# Patient Record
Sex: FEMALE | Race: WHITE | NOT HISPANIC OR LATINO | Employment: OTHER | URBAN - METROPOLITAN AREA
[De-identification: names, ages, dates, MRNs, and addresses within clinical notes are randomized per-mention and may not be internally consistent; named-entity substitution may affect disease eponyms.]

---

## 2019-02-21 ENCOUNTER — TELEPHONE (OUTPATIENT)
Dept: CARDIOLOGY | Facility: CLINIC | Age: 54
End: 2019-02-21

## 2019-02-21 NOTE — TELEPHONE ENCOUNTER
Pt called to be worked in today with SHON. She has consult appt 3/7/19. She had episode of pain yesterday and drove 4 hours to come to ER (she went to [facility] not [facility]). She wanted to be seen today to keep from making another 4 hour trip She is getting records from ER at [facility]. She was offered consult with available provider today however she declines. She would like to see MRJ only. She will keep March 7th appt. Records in chart reviewed, normal echo, history of afib, normal coronaries from 2010. ISELA

## 2019-03-07 ENCOUNTER — APPOINTMENT (OUTPATIENT)
Dept: PREADMISSION TESTING | Facility: HOSPITAL | Age: 54
End: 2019-03-07

## 2019-03-07 ENCOUNTER — CONSULT (OUTPATIENT)
Dept: CARDIOLOGY | Facility: CLINIC | Age: 54
End: 2019-03-07

## 2019-03-07 VITALS
HEIGHT: 70 IN | BODY MASS INDEX: 38.22 KG/M2 | WEIGHT: 267 LBS | DIASTOLIC BLOOD PRESSURE: 112 MMHG | SYSTOLIC BLOOD PRESSURE: 148 MMHG | HEART RATE: 71 BPM

## 2019-03-07 DIAGNOSIS — R94.31 ABNORMAL EKG: ICD-10-CM

## 2019-03-07 DIAGNOSIS — I10 ESSENTIAL HYPERTENSION: ICD-10-CM

## 2019-03-07 DIAGNOSIS — H54.8 LEGALLY BLIND: ICD-10-CM

## 2019-03-07 DIAGNOSIS — R94.39 ABNORMAL STRESS TEST: ICD-10-CM

## 2019-03-07 DIAGNOSIS — R07.9 CHEST PAIN, UNSPECIFIED TYPE: Primary | ICD-10-CM

## 2019-03-07 DIAGNOSIS — R07.9 CHEST PAIN, UNSPECIFIED TYPE: ICD-10-CM

## 2019-03-07 DIAGNOSIS — I48.0 PAF (PAROXYSMAL ATRIAL FIBRILLATION) (HCC): ICD-10-CM

## 2019-03-07 LAB
ALBUMIN SERPL-MCNC: 4.6 G/DL (ref 3.2–4.8)
ALBUMIN/GLOB SERPL: 2.1 G/DL (ref 1.5–2.5)
ALP SERPL-CCNC: 94 U/L (ref 25–100)
ALT SERPL W P-5'-P-CCNC: 24 U/L (ref 7–40)
ANION GAP SERPL CALCULATED.3IONS-SCNC: 9 MMOL/L (ref 3–11)
ARTICHOKE IGE QN: 112 MG/DL (ref 0–130)
AST SERPL-CCNC: 20 U/L (ref 0–33)
BILIRUB SERPL-MCNC: 0.4 MG/DL (ref 0.3–1.2)
BUN BLD-MCNC: 22 MG/DL (ref 9–23)
BUN/CREAT SERPL: 23.2 (ref 7–25)
CALCIUM SPEC-SCNC: 9.4 MG/DL (ref 8.7–10.4)
CHLORIDE SERPL-SCNC: 107 MMOL/L (ref 99–109)
CHOLEST SERPL-MCNC: 158 MG/DL (ref 0–200)
CO2 SERPL-SCNC: 28 MMOL/L (ref 20–31)
CREAT BLD-MCNC: 0.95 MG/DL (ref 0.6–1.3)
DEPRECATED RDW RBC AUTO: 49.6 FL (ref 37–54)
ERYTHROCYTE [DISTWIDTH] IN BLOOD BY AUTOMATED COUNT: 14 % (ref 11.3–14.5)
GFR SERPL CREATININE-BSD FRML MDRD: 61 ML/MIN/1.73
GLOBULIN UR ELPH-MCNC: 2.2 GM/DL
GLUCOSE BLD-MCNC: 86 MG/DL (ref 70–100)
HBA1C MFR BLD: 5.6 % (ref 4.8–5.6)
HCT VFR BLD AUTO: 43.8 % (ref 34.5–44)
HDLC SERPL-MCNC: 28 MG/DL (ref 40–60)
HGB BLD-MCNC: 13.4 G/DL (ref 11.5–15.5)
MCH RBC QN AUTO: 29.5 PG (ref 27–31)
MCHC RBC AUTO-ENTMCNC: 30.6 G/DL (ref 32–36)
MCV RBC AUTO: 96.5 FL (ref 80–99)
PLATELET # BLD AUTO: 174 10*3/MM3 (ref 150–450)
PMV BLD AUTO: 11.5 FL (ref 6–12)
POTASSIUM BLD-SCNC: 4.2 MMOL/L (ref 3.5–5.5)
PROT SERPL-MCNC: 6.8 G/DL (ref 5.7–8.2)
RBC # BLD AUTO: 4.54 10*6/MM3 (ref 3.89–5.14)
SODIUM BLD-SCNC: 144 MMOL/L (ref 132–146)
TRIGL SERPL-MCNC: 154 MG/DL (ref 0–150)
TSH SERPL DL<=0.05 MIU/L-ACNC: 2 MIU/ML (ref 0.35–5.35)
WBC NRBC COR # BLD: 6.45 10*3/MM3 (ref 3.5–10.8)

## 2019-03-07 PROCEDURE — 99204 OFFICE O/P NEW MOD 45 MIN: CPT | Performed by: INTERNAL MEDICINE

## 2019-03-07 PROCEDURE — 85027 COMPLETE CBC AUTOMATED: CPT | Performed by: PHYSICIAN ASSISTANT

## 2019-03-07 PROCEDURE — 83036 HEMOGLOBIN GLYCOSYLATED A1C: CPT | Performed by: PHYSICIAN ASSISTANT

## 2019-03-07 PROCEDURE — 93000 ELECTROCARDIOGRAM COMPLETE: CPT | Performed by: INTERNAL MEDICINE

## 2019-03-07 PROCEDURE — 80061 LIPID PANEL: CPT | Performed by: PHYSICIAN ASSISTANT

## 2019-03-07 PROCEDURE — 84443 ASSAY THYROID STIM HORMONE: CPT | Performed by: PHYSICIAN ASSISTANT

## 2019-03-07 PROCEDURE — 36415 COLL VENOUS BLD VENIPUNCTURE: CPT

## 2019-03-07 PROCEDURE — 80053 COMPREHEN METABOLIC PANEL: CPT | Performed by: PHYSICIAN ASSISTANT

## 2019-03-07 RX ORDER — CLOPIDOGREL BISULFATE 75 MG/1
600 TABLET ORAL ONCE
Status: CANCELLED | OUTPATIENT
Start: 2019-03-07 | End: 2019-03-07

## 2019-03-07 RX ORDER — CLOPIDOGREL BISULFATE 75 MG/1
75 TABLET ORAL DAILY
Status: CANCELLED | OUTPATIENT
Start: 2019-03-08

## 2019-03-07 RX ORDER — METOPROLOL SUCCINATE 50 MG/1
50 TABLET, EXTENDED RELEASE ORAL EVERY MORNING
COMMUNITY
End: 2019-09-19 | Stop reason: SDUPTHER

## 2019-03-07 RX ORDER — HYDROCODONE BITARTRATE AND ACETAMINOPHEN 10; 325 MG/1; MG/1
1 TABLET ORAL EVERY 8 HOURS PRN
COMMUNITY

## 2019-03-07 RX ORDER — DILTIAZEM HYDROCHLORIDE 240 MG/1
240 CAPSULE, COATED, EXTENDED RELEASE ORAL DAILY
COMMUNITY
End: 2020-09-29 | Stop reason: SDUPTHER

## 2019-03-07 RX ORDER — MELATONIN
1000 TAKE AS DIRECTED
COMMUNITY
End: 2019-09-19

## 2019-03-07 RX ORDER — OMEPRAZOLE 20 MG/1
20 CAPSULE, DELAYED RELEASE ORAL DAILY
Status: ON HOLD | COMMUNITY
End: 2019-03-15

## 2019-03-07 RX ORDER — ACETAMINOPHEN 325 MG/1
650 TABLET ORAL EVERY 4 HOURS PRN
Status: CANCELLED | OUTPATIENT
Start: 2019-03-07

## 2019-03-07 RX ORDER — NITROGLYCERIN 0.4 MG/1
0.4 TABLET SUBLINGUAL
COMMUNITY
End: 2021-06-24

## 2019-03-07 RX ORDER — AMOXICILLIN 125 MG/5ML
POWDER, FOR SUSPENSION ORAL 3 TIMES DAILY
COMMUNITY
End: 2019-03-07

## 2019-03-07 RX ORDER — NITROGLYCERIN 0.4 MG/1
0.4 TABLET SUBLINGUAL
Status: CANCELLED | OUTPATIENT
Start: 2019-03-07

## 2019-03-07 RX ORDER — METOPROLOL SUCCINATE 25 MG/1
25 TABLET, EXTENDED RELEASE ORAL EVERY EVENING
COMMUNITY
End: 2020-09-29 | Stop reason: SDUPTHER

## 2019-03-07 NOTE — DISCHARGE INSTRUCTIONS

## 2019-03-07 NOTE — H&P (VIEW-ONLY)
[facility] at [facility]  INITIAL OFFICE CONSULT    Celia Hurt  : 1965  MRN:[id number]  Patient Address: [address]  [facility] [address]    Date of Encounter: 2019    PCP: Miroslava White MD  [id number]   [facility] [address]  Referring MD: Dr. White    IDENTIFICATION: A 53 y.o. female resident of [place], [place]     Chief Complaint   Patient presents with   • Chest Pain   • Shortness of Breath   • Dizziness   • Edema     PROBLEM LIST:   1. Chest pain   a. Cardiac catheterization 2010: Normal coronaries, IDB  b. Lexiscan MPS 2018: discordant interpretation with MPS imaging normal, however also reported inferoapical infarct vs artifact  c. Echo 2018: Normal LVEF, no significant abnormalities noted   d. Mixed feature chest pain 2019   2. Paroxysmal atrial fibrillation   a. Diagnosed summer 2018  b. 30 day monitor 10/2018: SR with occasional PAC’s and PVC’s   c. CHADsVASC= 2, on Xarelto   3. Hypertension   4. 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 GERD  6. History of tobacco abuse, inactive   7. Surgical history:  a. Cholecystectomy  b. Left nephrectomy     ALLERGIES: Allergies not on file    CURRENT MEDICATIONS:   •  diltiaZEM CD (CARDIZEM CD) 240 MG 24 hr capsule,  Daily  •  Ergocalciferol (VITAMIN D2 PO), Take 50,000 Units by mouth 1 (One) Time Per Week.  •  HYDROcodone-acetaminophen (NORCO)  MG per tablet,  Every 6 (Six) Hours As Needed for Moderate Pain  •  metoprolol succinate XL (TOPROL-XL) 25 MG 24 hr tablet, Take 25 mg by mouth Every Evening  •  metoprolol succinate XL (TOPROL-XL) 50 MG 24 hr tablet, Take 50 mg by mouth Every Morning.  •  nitroglycerin (NITROSTAT) 0.4 MG SL tablet, Place 0.4 mg under the tongue Every 5 (Five) Minutes As Needed for Chest Pain. Take no more than 3 doses in 15 minutes  •  omeprazole (priLOSEC) 20 MG capsule, Take 20 mg by mouth Daily.  •  rivaroxaban (XARELTO) 20 MG tablet, Take 20 mg by mouth Daily    HPI: Mrs.  Blu is a 53 y.o WF with above noted history who presents in consultation for a second opinion. She has previously been seen by Dr. Tahir Bullock in [place]. Since earlier this summer she has been to the ER in [facility] with episodes of chest pain, and has been diagnosed with atrial fibrillation at one of these emergency room visits. Strips or EKG showing atrial fibrillation are not available. She has been started on Xarelto and Diltiazem and has undergone an echo and stress test. The stress test has discordant findings with findings reported of normal perfusion scan, however also reported inferoapical infarct vs artifact. Her EKG in office today is abnormal showing inferior Q waves. She has continued to have intermittent episodes of atypical chest pain and presented to [facility] ER 2 weeks ago for this complaint. She ruled out for MI presents today for further evaluation. She is legally blind secondary to histoplasmosis in . She is relatively active however and mows grass as well as walks her dog. She denies history of MI, CVA, DVT or PE.      Cardiac Risk Factors include: dyslipidemia, hypertension and obesity (BMI >= 30 kg/m2)    ROS: All systems have been reviewed and are negative with the exception of those mentioned in the HPI and problem list above.    Surgical History: History reviewed. As above    Social History:   Social History     Socioeconomic History   • Marital status: Single   Occupational History   • Not on file   Tobacco Use   • Smoking status: Former Smoker     Last attempt to quit: 3/7/1999     Years since quittin.0   • Smokeless tobacco: Never Used   Substance and Sexual Activity   • Alcohol use: No     Frequency: Never   • Drug use: No     Family History:   Family History   Problem Relation Age of Onset   • Hyperlipidemia Mother    • Hypertension Father    • Clotting disorder Father    • Stroke Sister    • Hypertension Brother    • Hypertension Other    • Hyperlipidemia Other  "   • Heart disease Other    • Stroke Other    • Diabetes Other    • Clotting disorder Other        Objective     Vitals:    03/07/19 1309 03/07/19 1310 03/07/19 1311   BP: 171/96 146/97 (!) 148/112   BP Location: Right arm Left arm Left arm   Patient Position: Sitting Sitting Standing   Pulse: 59 58 71   Weight: 121 kg (267 lb)     Height: 177.8 cm (70\")       Body mass index is 38.31 kg/m².    PHYSICAL EXAM:  CONSTITUTIONAL: Well nourished, cooperative, in no acute distress  HEENT: Normocephalic, atraumatic, PERRLA, no JVD, no carotid bruit  CARDIOVASCULAR:  Regular rhythm and normal rate, no murmur, gallop, rub. Peripheral pulses are present and equal bilaterally  RESPIRATORY: Clear to auscultation, normal respiratory effort, no wheezing, rales or ronchi  GI: Soft, nontender, normal bowel sounds  MUSCULOSKELETAL: No gross deformities, no edema  SKIN: Warm, dry. No bleeding, bruising or rash  NEUROLOGICAL: No focal deficits  PSYCHIATRIC: Normal mood and affect. Behavior is normal     Labs/Diagnostic Data  Labs 10/2018:   CBC: WBC 5.2, RBC 4.42, Hgb 13.5, Hct 38.7, Plt 162  CMP: Glcuose 93, BUN 15, Cr 0.92, eGFR 71, Na 145, K 4.1, Cl 104, CO2 24, Ca 8.8, Alk Phos 86, AST 14, ALT 18  Lipid Panel: , HDL 27, LDL 82, Trig 192  A1C: 5.4%  TSH 2.45      ECG 12 Lead  Date/Time: 3/7/2019 5:23 PM  Performed by: Bertha Charles PA-C  Authorized by: Bertha Charles PA-C   Rhythm: sinus rhythm  Rate: normal  BPM: 58  QRS axis: left    Clinical impression: abnormal EKG  Comments: Inferior infarct, old         Radiology Data:   CXR report 2/21/2019 OSH: left basilar opacity, atelectasis vs pneumonia; recommend upright PA and lateral CXR     Assessment and Plan:     1. She has atypical chest pain, however her EKG is abnormal with inferior Q waves. She as well had a Cardiolite stress test with discordant interpretation of normal perfusion, however with an inferoapical defect.   2. She needs a catheterization " and we will plan radial approach.  3. We will in the meantime try to obtain documentation of atrial fibrillation.    4. Final disposition to follow.      Thank you for allowing me to participate in the care of Celia Hurt. Feel free to contact me directly with any further questions or concerns.    Scribed for Reji Rivera MD by Bertha Charles PA-C. 3/7/2019  1:26 PM   I, Reji Rivera MD, [facility], [facility], personally performed the services described in this documentation as scribed by the above named individual in my presence, and it is both accurate and complete. At 5:56 PM on 03/07/2019

## 2019-03-07 NOTE — PROGRESS NOTES
[facility] at [facility]  INITIAL OFFICE CONSULT    Celia Hurt  : 1965  MRN:[id number]  Patient Address: [address]  [facility] [address]    Date of Encounter: 2019    PCP: Miroslava White MD  [id number]   [facility] [address]  Referring MD: Dr. White    IDENTIFICATION: A 53 y.o. female resident of [place], [place]     Chief Complaint   Patient presents with   • Chest Pain   • Shortness of Breath   • Dizziness   • Edema     PROBLEM LIST:   1. Chest pain   a. Cardiac catheterization 2010: Normal coronaries, IDB  b. Lexiscan MPS 2018: discordant interpretation with MPS imaging normal, however also reported inferoapical infarct vs artifact  c. Echo 2018: Normal LVEF, no significant abnormalities noted   d. Mixed feature chest pain 2019   2. Paroxysmal atrial fibrillation   a. Diagnosed summer 2018  b. 30 day monitor 10/2018: SR with occasional PAC’s and PVC’s   c. CHADsVASC= 2, on Xarelto   3. Hypertension   4. 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 GERD  6. History of tobacco abuse, inactive   7. Surgical history:  a. Cholecystectomy  b. Left nephrectomy     ALLERGIES: Allergies not on file    CURRENT MEDICATIONS:   •  diltiaZEM CD (CARDIZEM CD) 240 MG 24 hr capsule,  Daily  •  Ergocalciferol (VITAMIN D2 PO), Take 50,000 Units by mouth 1 (One) Time Per Week.  •  HYDROcodone-acetaminophen (NORCO)  MG per tablet,  Every 6 (Six) Hours As Needed for Moderate Pain  •  metoprolol succinate XL (TOPROL-XL) 25 MG 24 hr tablet, Take 25 mg by mouth Every Evening  •  metoprolol succinate XL (TOPROL-XL) 50 MG 24 hr tablet, Take 50 mg by mouth Every Morning.  •  nitroglycerin (NITROSTAT) 0.4 MG SL tablet, Place 0.4 mg under the tongue Every 5 (Five) Minutes As Needed for Chest Pain. Take no more than 3 doses in 15 minutes  •  omeprazole (priLOSEC) 20 MG capsule, Take 20 mg by mouth Daily.  •  rivaroxaban (XARELTO) 20 MG tablet, Take 20 mg by mouth Daily    HPI: Mrs.  Blu is a 53 y.o WF with above noted history who presents in consultation for a second opinion. She has previously been seen by Dr. Tahir Bullock in [place]. Since earlier this summer she has been to the ER in [facility] with episodes of chest pain, and has been diagnosed with atrial fibrillation at one of these emergency room visits. Strips or EKG showing atrial fibrillation are not available. She has been started on Xarelto and Diltiazem and has undergone an echo and stress test. The stress test has discordant findings with findings reported of normal perfusion scan, however also reported inferoapical infarct vs artifact. Her EKG in office today is abnormal showing inferior Q waves. She has continued to have intermittent episodes of atypical chest pain and presented to [facility] ER 2 weeks ago for this complaint. She ruled out for MI presents today for further evaluation. She is legally blind secondary to histoplasmosis in . She is relatively active however and mows grass as well as walks her dog. She denies history of MI, CVA, DVT or PE.      Cardiac Risk Factors include: dyslipidemia, hypertension and obesity (BMI >= 30 kg/m2)    ROS: All systems have been reviewed and are negative with the exception of those mentioned in the HPI and problem list above.    Surgical History: History reviewed. As above    Social History:   Social History     Socioeconomic History   • Marital status: Single   Occupational History   • Not on file   Tobacco Use   • Smoking status: Former Smoker     Last attempt to quit: 3/7/1999     Years since quittin.0   • Smokeless tobacco: Never Used   Substance and Sexual Activity   • Alcohol use: No     Frequency: Never   • Drug use: No     Family History:   Family History   Problem Relation Age of Onset   • Hyperlipidemia Mother    • Hypertension Father    • Clotting disorder Father    • Stroke Sister    • Hypertension Brother    • Hypertension Other    • Hyperlipidemia Other  "   • Heart disease Other    • Stroke Other    • Diabetes Other    • Clotting disorder Other        Objective     Vitals:    03/07/19 1309 03/07/19 1310 03/07/19 1311   BP: 171/96 146/97 (!) 148/112   BP Location: Right arm Left arm Left arm   Patient Position: Sitting Sitting Standing   Pulse: 59 58 71   Weight: 121 kg (267 lb)     Height: 177.8 cm (70\")       Body mass index is 38.31 kg/m².    PHYSICAL EXAM:  CONSTITUTIONAL: Well nourished, cooperative, in no acute distress  HEENT: Normocephalic, atraumatic, PERRLA, no JVD, no carotid bruit  CARDIOVASCULAR:  Regular rhythm and normal rate, no murmur, gallop, rub. Peripheral pulses are present and equal bilaterally  RESPIRATORY: Clear to auscultation, normal respiratory effort, no wheezing, rales or ronchi  GI: Soft, nontender, normal bowel sounds  MUSCULOSKELETAL: No gross deformities, no edema  SKIN: Warm, dry. No bleeding, bruising or rash  NEUROLOGICAL: No focal deficits  PSYCHIATRIC: Normal mood and affect. Behavior is normal     Labs/Diagnostic Data  Labs 10/2018:   CBC: WBC 5.2, RBC 4.42, Hgb 13.5, Hct 38.7, Plt 162  CMP: Glcuose 93, BUN 15, Cr 0.92, eGFR 71, Na 145, K 4.1, Cl 104, CO2 24, Ca 8.8, Alk Phos 86, AST 14, ALT 18  Lipid Panel: , HDL 27, LDL 82, Trig 192  A1C: 5.4%  TSH 2.45      ECG 12 Lead  Date/Time: 3/7/2019 5:23 PM  Performed by: Bertha Charles PA-C  Authorized by: Bertha Charles PA-C   Rhythm: sinus rhythm  Rate: normal  BPM: 58  QRS axis: left    Clinical impression: abnormal EKG  Comments: Inferior infarct, old         Radiology Data:   CXR report 2/21/2019 OSH: left basilar opacity, atelectasis vs pneumonia; recommend upright PA and lateral CXR     Assessment and Plan:     1. She has atypical chest pain, however her EKG is abnormal with inferior Q waves. She as well had a Cardiolite stress test with discordant interpretation of normal perfusion, however with an inferoapical defect.   2. She needs a catheterization " and we will plan radial approach.  3. We will in the meantime try to obtain documentation of atrial fibrillation.    4. Final disposition to follow.      Thank you for allowing me to participate in the care of Celia Hurt. Feel free to contact me directly with any further questions or concerns.    Scribed for Reji Rivera MD by Bertha Charles PA-C. 3/7/2019  1:26 PM   I, Reji Rivera MD, [facility], [facility], personally performed the services described in this documentation as scribed by the above named individual in my presence, and it is both accurate and complete. At 5:56 PM on 03/07/2019

## 2019-03-15 ENCOUNTER — APPOINTMENT (OUTPATIENT)
Dept: GENERAL RADIOLOGY | Facility: HOSPITAL | Age: 54
End: 2019-03-15

## 2019-03-15 ENCOUNTER — HOSPITAL ENCOUNTER (OUTPATIENT)
Facility: HOSPITAL | Age: 54
Discharge: HOME | End: 2019-03-15
Attending: INTERNAL MEDICINE | Admitting: INTERNAL MEDICINE

## 2019-03-15 VITALS
HEART RATE: 60 BPM | OXYGEN SATURATION: 92 % | DIASTOLIC BLOOD PRESSURE: 68 MMHG | RESPIRATION RATE: 18 BRPM | WEIGHT: 268.74 LBS | BODY MASS INDEX: 38.47 KG/M2 | HEIGHT: 70 IN | SYSTOLIC BLOOD PRESSURE: 98 MMHG | TEMPERATURE: 97.9 F

## 2019-03-15 DIAGNOSIS — R94.31 ABNORMAL EKG: ICD-10-CM

## 2019-03-15 DIAGNOSIS — R94.39 ABNORMAL STRESS TEST: ICD-10-CM

## 2019-03-15 DIAGNOSIS — H54.8 LEGALLY BLIND: ICD-10-CM

## 2019-03-15 DIAGNOSIS — I48.0 PAF (PAROXYSMAL ATRIAL FIBRILLATION) (HCC): ICD-10-CM

## 2019-03-15 DIAGNOSIS — I10 ESSENTIAL HYPERTENSION: ICD-10-CM

## 2019-03-15 DIAGNOSIS — R07.9 CHEST PAIN, UNSPECIFIED TYPE: ICD-10-CM

## 2019-03-15 LAB — B-HCG UR QL: NEGATIVE

## 2019-03-15 PROCEDURE — 0 IOPAMIDOL PER 1 ML: Performed by: INTERNAL MEDICINE

## 2019-03-15 PROCEDURE — 71046 X-RAY EXAM CHEST 2 VIEWS: CPT

## 2019-03-15 PROCEDURE — 25010000002 FENTANYL CITRATE (PF) 100 MCG/2ML SOLUTION: Performed by: INTERNAL MEDICINE

## 2019-03-15 PROCEDURE — 63710000001 CLOPIDOGREL 75 MG TABLET: Performed by: PHYSICIAN ASSISTANT

## 2019-03-15 PROCEDURE — C1769 GUIDE WIRE: HCPCS | Performed by: INTERNAL MEDICINE

## 2019-03-15 PROCEDURE — A9270 NON-COVERED ITEM OR SERVICE: HCPCS | Performed by: PHYSICIAN ASSISTANT

## 2019-03-15 PROCEDURE — 25010000002 MIDAZOLAM PER 1 MG: Performed by: INTERNAL MEDICINE

## 2019-03-15 PROCEDURE — 93458 L HRT ARTERY/VENTRICLE ANGIO: CPT | Performed by: INTERNAL MEDICINE

## 2019-03-15 PROCEDURE — 81025 URINE PREGNANCY TEST: CPT | Performed by: INTERNAL MEDICINE

## 2019-03-15 PROCEDURE — 25010000002 HEPARIN (PORCINE) PER 1000 UNITS: Performed by: INTERNAL MEDICINE

## 2019-03-15 PROCEDURE — C1894 INTRO/SHEATH, NON-LASER: HCPCS | Performed by: INTERNAL MEDICINE

## 2019-03-15 RX ORDER — ACETAMINOPHEN 325 MG/1
650 TABLET ORAL EVERY 4 HOURS PRN
Status: DISCONTINUED | OUTPATIENT
Start: 2019-03-15 | End: 2019-03-15 | Stop reason: HOSPADM

## 2019-03-15 RX ORDER — CLOPIDOGREL BISULFATE 75 MG/1
600 TABLET ORAL ONCE
Status: COMPLETED | OUTPATIENT
Start: 2019-03-15 | End: 2019-03-15

## 2019-03-15 RX ORDER — NITROGLYCERIN 0.4 MG/1
0.4 TABLET SUBLINGUAL
Status: DISCONTINUED | OUTPATIENT
Start: 2019-03-15 | End: 2019-03-15 | Stop reason: HOSPADM

## 2019-03-15 RX ORDER — PANTOPRAZOLE SODIUM 40 MG/1
40 TABLET, DELAYED RELEASE ORAL EVERY MORNING
Status: DISCONTINUED | OUTPATIENT
Start: 2019-03-16 | End: 2019-03-15 | Stop reason: HOSPADM

## 2019-03-15 RX ORDER — METOPROLOL SUCCINATE 25 MG/1
25 TABLET, EXTENDED RELEASE ORAL EVERY EVENING
Status: DISCONTINUED | OUTPATIENT
Start: 2019-03-15 | End: 2019-03-15 | Stop reason: HOSPADM

## 2019-03-15 RX ORDER — SODIUM CHLORIDE 9 MG/ML
1-3 INJECTION, SOLUTION INTRAVENOUS CONTINUOUS
Status: DISCONTINUED | OUTPATIENT
Start: 2019-03-15 | End: 2019-03-15 | Stop reason: HOSPADM

## 2019-03-15 RX ORDER — CLOPIDOGREL BISULFATE 75 MG/1
75 TABLET ORAL DAILY
Status: DISCONTINUED | OUTPATIENT
Start: 2019-03-16 | End: 2019-03-15 | Stop reason: HOSPADM

## 2019-03-15 RX ORDER — MIDAZOLAM HYDROCHLORIDE 1 MG/ML
INJECTION INTRAMUSCULAR; INTRAVENOUS AS NEEDED
Status: DISCONTINUED | OUTPATIENT
Start: 2019-03-15 | End: 2019-03-15 | Stop reason: HOSPADM

## 2019-03-15 RX ORDER — OMEPRAZOLE 20 MG/1
40 CAPSULE, DELAYED RELEASE ORAL DAILY
COMMUNITY

## 2019-03-15 RX ORDER — LIDOCAINE HYDROCHLORIDE 10 MG/ML
INJECTION, SOLUTION EPIDURAL; INFILTRATION; INTRACAUDAL; PERINEURAL AS NEEDED
Status: DISCONTINUED | OUTPATIENT
Start: 2019-03-15 | End: 2019-03-15 | Stop reason: HOSPADM

## 2019-03-15 RX ORDER — DILTIAZEM HYDROCHLORIDE 240 MG/1
240 CAPSULE, COATED, EXTENDED RELEASE ORAL DAILY
Status: DISCONTINUED | OUTPATIENT
Start: 2019-03-16 | End: 2019-03-15 | Stop reason: HOSPADM

## 2019-03-15 RX ORDER — ATORVASTATIN CALCIUM 20 MG/1
20 TABLET, FILM COATED ORAL NIGHTLY
Qty: 30 TABLET | Refills: 11 | Status: SHIPPED | OUTPATIENT
Start: 2019-03-15 | End: 2020-09-29 | Stop reason: SDUPTHER

## 2019-03-15 RX ORDER — FENTANYL CITRATE 50 UG/ML
INJECTION, SOLUTION INTRAMUSCULAR; INTRAVENOUS AS NEEDED
Status: DISCONTINUED | OUTPATIENT
Start: 2019-03-15 | End: 2019-03-15 | Stop reason: HOSPADM

## 2019-03-15 RX ORDER — ASPIRIN 81 MG/1
81 TABLET ORAL EVERY OTHER DAY
COMMUNITY
End: 2021-06-24

## 2019-03-15 RX ORDER — ASPIRIN 81 MG/1
81 TABLET ORAL DAILY
Status: DISCONTINUED | OUTPATIENT
Start: 2019-03-16 | End: 2019-03-15 | Stop reason: HOSPADM

## 2019-03-15 RX ADMIN — SODIUM CHLORIDE 2.71 ML/KG/HR: 9 INJECTION, SOLUTION INTRAVENOUS at 10:31

## 2019-03-15 RX ADMIN — CLOPIDOGREL BISULFATE 600 MG: 75 TABLET ORAL at 09:42

## 2019-03-15 NOTE — INTERVAL H&P NOTE
"  H&P reviewed. The patient was examined and there are no changes to the H&P.       Vitals and Labs:  Vitals:    03/15/19 0915 03/15/19 0917   BP: 145/93 157/98   BP Location: Right arm Left arm   Patient Position: Lying Lying   Pulse: 72 70   Resp: 18    Temp: 97.9 °F (36.6 °C)    TempSrc: Temporal    SpO2: 95% 93%   Weight: 122 kg (268 lb 11.9 oz)    Height: 177.8 cm (70\")        Lab Results   Component Value Date    WBC 6.45 03/07/2019    HGB 13.4 03/07/2019    HCT 43.8 03/07/2019    MCV 96.5 03/07/2019     03/07/2019     Lab Results   Component Value Date    GLUCOSE 86 03/07/2019    BUN 22 03/07/2019    CREATININE 0.95 03/07/2019    EGFRIFNONA 61 03/07/2019    BCR 23.2 03/07/2019    K 4.2 03/07/2019    CO2 28.0 03/07/2019    CALCIUM 9.4 03/07/2019    ALBUMIN 4.60 03/07/2019    AST 20 03/07/2019    ALT 24 03/07/2019     Lab Results   Component Value Date    HGBA1C 5.60 03/07/2019     Lab Results   Component Value Date    CHOL 158 03/07/2019    TRIG 154 (H) 03/07/2019    HDL 28 (L) 03/07/2019     03/07/2019     Lab Results   Component Value Date    TSH 2.003 03/07/2019       Assessment:  · 54 y/o WF with history of tobacco abuse, HTN and PAF with recent symptoms of mixed feature chest pain and an indeterminate MPS due to discordant interpretation. She presents for definitive evaluation with left heart catheterization and intervention standby  · Last dose of Xarelto was Monday 3/11    Plan:   · LHC +/- CBI. Risks, benefits and alternatives to the procedure explained to the patient and she understands and wishes to proceed.     I, Reji Rivera MD, personally performed the services described as documented by the above named individual. I have made any necessary edits and it is both accurate and complete 3/15/2019  11:47 AM    Electronically signed by Bertha Charles PA-C, 03/15/19, 9:56 AM.      "

## 2019-03-18 ENCOUNTER — DOCUMENTATION (OUTPATIENT)
Dept: CARDIAC REHAB | Facility: HOSPITAL | Age: 54
End: 2019-03-18

## 2019-09-19 ENCOUNTER — OFFICE VISIT (OUTPATIENT)
Dept: CARDIOLOGY | Facility: CLINIC | Age: 54
End: 2019-09-19

## 2019-09-19 VITALS
SYSTOLIC BLOOD PRESSURE: 142 MMHG | DIASTOLIC BLOOD PRESSURE: 93 MMHG | HEIGHT: 70 IN | WEIGHT: 251 LBS | BODY MASS INDEX: 35.93 KG/M2 | HEART RATE: 64 BPM

## 2019-09-19 DIAGNOSIS — I10 ESSENTIAL HYPERTENSION: ICD-10-CM

## 2019-09-19 DIAGNOSIS — I48.0 PAF (PAROXYSMAL ATRIAL FIBRILLATION) (HCC): Primary | ICD-10-CM

## 2019-09-19 DIAGNOSIS — I77.89 CORONARY ARTERY ECTASIA (HCC): ICD-10-CM

## 2019-09-19 PROCEDURE — 99213 OFFICE O/P EST LOW 20 MIN: CPT | Performed by: INTERNAL MEDICINE

## 2019-09-19 RX ORDER — ERGOCALCIFEROL 1.25 MG/1
50000 CAPSULE ORAL WEEKLY
COMMUNITY

## 2019-09-19 NOTE — PROGRESS NOTES
OFFICE FOLLOW UP     Date of Encounter:2019     Name: Celia Hurt  : 1965  Address:  [address] [place] [address] [place] [address]    PCP: Miroslava White MD  [address] [place] [address] [place] [address]    Celia Hurt is a 54 y.o. female.      Chief Complaint: Follow up of  CAD, HTN, PAF    Problem List:   1. Chest pain   a. Cardiac catheterization 2010: Normal coronaries, IDB  b. Lexiscan MPS 2018: discordant interpretation with MPS imaging normal, however also reported inferoapical infarct vs artifact  c. Echo 2018: Normal LVEF, no significant abnormalities noted   d. Mixed feature chest pain 2019   e. [facility], 3/15/19: Normal LV function, minor nonobstructive CAD (ectasia in all 3 major vessels)  2. Paroxysmal atrial fibrillation   a. Diagnosed summer 2018  b. 30 day monitor 10/2018: SR with occasional PAC’s and PVC’s   c. CHADsVASC= 2, on Xarelto   3. Hypertension   4. 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 GERD  6. History of tobacco abuse, inactive   7. Surgical history:  a. Cholecystectomy  b. Left nephrectomy   c. Oral extractions    Allergies:  No Known Allergies    Current Medications:  •  aspirin 81 MG EC tablet, Take 81 mg by mouth Every Other Day. Mon, Wed, Fri  •  atorvastatin (LIPITOR) 20 mg by mouth Every Other Day. Mon, Wed, Fri  •  diltiaZEM CD (CARDIZEM CD) 240 MG 24 hr capsule, Take 240 mg by mouth Daily  •  HYDROcodone-acetaminophen (NORCO)  MG per tablet, Take 1 tablet by mouth Every 8 (Eight) Hours As Needed for Moderate Pain .  •  metoprolol succinate XL (TOPROL-XL) 25 MG 24 hr tablet, Take 50 mg every morning and 25 mg by mouth Every Evening.  •  nitroglycerin (NITROSTAT) 0.4 MG SL tablet, Place 0.4 mg under the tongue Every 5 (Five) Minutes As Needed for Chest Pain. Take no more than 3 doses in 15 minutes  •  omeprazole (priLOSEC) 20 MG capsule, Take 20 mg by mouth Daily.  •  rivaroxaban (XARELTO) 20 MG tablet, Take 1 tablet by mouth Daily.  •  vitamin D  "(ERGOCALCIFEROL) 69911 units capsule capsule, Take 50,000 Units by mouth 1 (One) Time Per Week.    History of Present Illness:       Celia Hurt returns for follow up today.  She has rare occurrences of tachy palpitations that wake her at night but these are not sustained. She mows her grass and [place]. She has had some falls related to her vision loss. She is compliant with medications listed above. She has not had angina or shortness of breath.  She has some atypical pain in her epigastric area that is relieved by belching.     The following portions of the patient's history were reviewed and updated as appropriate: allergies, current medications and problem list.    ROS: Pertinent positives as listed in the HPI.  All other systems reviewed and negative.    Objective:    Vitals:    09/19/19 1425 09/19/19 1427   BP: 133/83 142/93   BP Location: Left arm Left arm   Patient Position: Sitting Standing   Pulse: 59 64   Weight: 114 kg (251 lb)    Height: 177.8 cm (70\")        Physical Exam:  GENERAL: Alert, cooperative, in no acute distress.   HEENT: Normocephalic, no adenopathy, no jugular venous distention  HEART: No discrete PMI is noted. Regular rhythm, normal rate, and no murmurs, gallops, or rubs.   LUNGS: Clear to auscultation bilaterally. No wheezing, rales or ronchi.  ABDOMEN: Soft, bowel sounds present, non-tender   NEUROLOGIC: No focal abnormalities involving strength or sensation are noted.   EXTREMITIES: No clubbing, cyanosis, or edema noted.       Diagnostic Data:  No new labs available to review.     Procedures      Assessment and Plan:   1.  Coronary artery ectasia: Atypical pain. Continue current medical management.   2.  HTN:  Slightly elevated today. We will not make medication changes. Follow closely with PCP.  3.  PAF:  Continue metoprolol and Xarelto. She has not had symptoms suggesting any important recurrent atrial fibrillation and is on a NOAC.  4.  HLD: She is taking " atorvastatin 20 mg 3 times weekly. Target LDL to less than 100 with her PCP.     I will see Celia Mai Hurt back in one year or sooner on an as needed basis.    Scribed for Reji Rivera MD by Pattie Barnes RN. 09/19/2019 3:25 PM.

## 2020-09-29 RX ORDER — METOPROLOL SUCCINATE 25 MG/1
25 TABLET, EXTENDED RELEASE ORAL EVERY EVENING
Qty: 90 TABLET | Refills: 1 | Status: SHIPPED | OUTPATIENT
Start: 2020-09-29 | End: 2021-06-24

## 2020-09-29 RX ORDER — ATORVASTATIN CALCIUM 20 MG/1
20 TABLET, FILM COATED ORAL NIGHTLY
Qty: 90 TABLET | Refills: 1 | Status: SHIPPED | OUTPATIENT
Start: 2020-09-29 | End: 2021-06-24

## 2020-09-29 RX ORDER — DILTIAZEM HYDROCHLORIDE 240 MG/1
240 CAPSULE, COATED, EXTENDED RELEASE ORAL DAILY
Qty: 90 CAPSULE | Refills: 1 | Status: SHIPPED | OUTPATIENT
Start: 2020-09-29

## 2021-06-24 ENCOUNTER — OFFICE VISIT (OUTPATIENT)
Dept: CARDIOLOGY | Facility: CLINIC | Age: 56
End: 2021-06-24

## 2021-06-24 VITALS
BODY MASS INDEX: 37.8 KG/M2 | SYSTOLIC BLOOD PRESSURE: 139 MMHG | HEART RATE: 65 BPM | WEIGHT: 264 LBS | OXYGEN SATURATION: 92 % | HEIGHT: 70 IN | DIASTOLIC BLOOD PRESSURE: 83 MMHG

## 2021-06-24 DIAGNOSIS — I77.89 CORONARY ARTERY ECTASIA (HCC): ICD-10-CM

## 2021-06-24 DIAGNOSIS — I10 ESSENTIAL HYPERTENSION: Primary | ICD-10-CM

## 2021-06-24 DIAGNOSIS — I48.0 PAF (PAROXYSMAL ATRIAL FIBRILLATION) (HCC): ICD-10-CM

## 2021-06-24 PROCEDURE — 93000 ELECTROCARDIOGRAM COMPLETE: CPT | Performed by: INTERNAL MEDICINE

## 2021-06-24 PROCEDURE — 99214 OFFICE O/P EST MOD 30 MIN: CPT | Performed by: INTERNAL MEDICINE

## 2021-06-24 RX ORDER — METOPROLOL SUCCINATE 50 MG/1
50 TABLET, EXTENDED RELEASE ORAL DAILY
COMMUNITY
End: 2021-06-24 | Stop reason: DRUGHIGH

## 2021-06-24 RX ORDER — METOPROLOL SUCCINATE 25 MG/1
25 TABLET, EXTENDED RELEASE ORAL DAILY
COMMUNITY
End: 2021-06-24 | Stop reason: DRUGHIGH

## 2021-06-24 RX ORDER — FLECAINIDE ACETATE 50 MG/1
TABLET ORAL 2 TIMES DAILY
COMMUNITY
Start: 2021-06-01

## 2021-06-24 RX ORDER — LOSARTAN POTASSIUM 50 MG/1
TABLET ORAL DAILY
COMMUNITY
Start: 2021-06-01 | End: 2021-06-24

## 2021-06-24 NOTE — PROGRESS NOTES
OFFICE FOLLOW UP     Date of Encounter:2021     Name: Celia Hurt  : 1965  Address: [address] [place] [address]    PCP: Miroslava White MD  [address] [place] [address] [place] [address]    Celia Hurt is a 56 y.o. female.      Chief Complaint: Follow up of CAD, PAF, HTN    Problem List:   1. Chest pain   a. Cardiac catheterization 2010: Normal coronaries, IDB  b. Lexiscan MPS 2018: discordant interpretation with MPS imaging normal, however also reported inferoapical infarct vs artifact  c. Echo 2018: Normal LVEF, no significant abnormalities noted   d. Mixed feature chest pain 2019   e. [facility], 3/15/19: Normal LV function, minor nonobstructive CAD (ectasia in all 3 major vessels)  2. Paroxysmal atrial fibrillation   a. Diagnosed summer 2018  b. 30 day monitor 10/2018: SR with occasional PAC’s and PVC’s   c. CHADsVASC= 2, on Xarelto   3. Hypertension   4. 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 GERD  6. History of tobacco abuse, inactive   7. Surgical history:  a. Cholecystectomy  b. Left nephrectomy   c. Oral extractions    Allergies:  No Known Allergies    Current Medications:  Current Outpatient Medications   Medication Instructions   • dilTIAZem CD (CARDIZEM CD) 240 mg, Oral, Daily   • flecainide (TAMBOCOR) 50 MG tablet 2 times daily   • HYDROcodone-acetaminophen (NORCO)  MG per tablet 1 tablet, Oral, Every 8 Hours PRN   • metoprolol succinate XL (TOPROL-XL) 50 mg, Oral, Daily   • metoprolol succinate XL (TOPROL-XL) 25 mg, Oral, Daily at night   • omeprazole (PRILOSEC) 40 mg, Oral, Daily   • rivaroxaban (XARELTO) 20 mg, Oral, Daily   • vitamin D (ERGOCALCIFEROL) 50,000 Units, Oral, Weekly        History of Present Illness:     Celia Hurt returns for overdue follow up today. She was last seen in our office in 2019. Since she was last seen she has been treated by Dr. Bullock in [place] for increased palpitations that were associated with chest  "pain, shortness of breath and anxiety. Dr. Bullock has prescribed flecainide and metoprolol. She takes two doses of metoprolol twice daily. She reports she snores but has not had a formal sleep study. She follows with her PCP closely who helps her with medications due to her vision loss. She brought her medication bottles with her today and she is not taking aspirin. She continues to have rare, brief (<1 minute) \"thumping\" palpitations but nothing prolonged. She is not able to check home blood pressures. She denies angina or symptoms of heart failure.      The following portions of the patient's history were reviewed and updated as appropriate: allergies, current medications and problem list.    ROS: Pertinent positives as listed in the HPI.  All other systems reviewed and negative.    Objective:    Vitals:    06/24/21 1250 06/24/21 1251   BP: (!) 151/103 139/83   BP Location: Right arm Right arm   Patient Position: Sitting Standing   Pulse: 56 65   SpO2: 92%    Weight: 120 kg (264 lb)    Height: 177.8 cm (70\")      Body mass index is 37.88 kg/m².    Physical Exam:  GENERAL: Alert, cooperative, in no acute distress.   HEENT: Normocephalic, no adenopathy, no jugular venous distention  HEART: No discrete PMI is noted. Regular rhythm, normal rate, and no murmurs, gallops, or rubs.   LUNGS: Clear to auscultation bilaterally. No wheezing, rales or ronchi.  ABDOMEN: Soft, bowel sounds present, non-tender   NEUROLOGIC: No focal abnormalities involving strength or sensation are noted.   EXTREMITIES: No clubbing, cyanosis, or edema noted.      Diagnostic Data:  No new labs available to review.      ECG 12 Lead    Date/Time: 6/24/2021 1:26 PM  Performed by: Reji Rivera MD  Authorized by: Reji Rivera MD   Comparison: compared with previous ECG from 3/19/2019  Comparison to previous ECG: afib with RVR is not seen  Rhythm: sinus bradycardia  BPM: 59  QRS axis: left    Clinical impression: abnormal EKG  Comments: No " evidence of flecainide excess            Assessment and Plan:   1.  Coronary ectasia: Begin an aspirin 81 mg daily. She is not on a statin and we do not have labs from PCP for review. Would ask PCP to target LDL to < 70 with the addition of a statin medication.   2.  HTN:  Elevated today and at last visit. We will add losartan 50 mg by mouth daily. Target BP is less than 130/80.  3.  PAF: EKG today, patient has been placed on Flecainide. Alter metoprolol succinate to 100 mg daily. Continue Xarelto. We will attempt to get records from [place] for further clarification of medication changes from last visit.     I will see Celia Hurt back in one year or sooner on an as needed basis.    Scribed for Reji Rivera MD by Pattie Barnes RN. 06/24/2021 14:19 EDT.      EMR Dragon/Transcription Disclaimer:  Much of this encounter note is an electronic transcription/translation of spoken language to printed text.  The electronic translation of spoken language may permit erroneous, or at times, nonsensical words or phrases to be inadvertently transcribed.  Although I have reviewed the note for such errors, some may still exist.

## (undated) DEVICE — CATH DIAG EXPO .056 FL3.5 6F 100CM

## (undated) DEVICE — KT MANIFOLD CATHLAB CUST

## (undated) DEVICE — INTRO SHEATH ENGAGE TR SS/STD .025 6F 12CM

## (undated) DEVICE — Device

## (undated) DEVICE — CATH DIAG EXPO M/ PK 6FR FL4/FR4 PIG 3PK

## (undated) DEVICE — DEV COMP RAD PRELUDESYNC 24CM

## (undated) DEVICE — PK CATH CARD 10